# Patient Record
Sex: FEMALE
[De-identification: names, ages, dates, MRNs, and addresses within clinical notes are randomized per-mention and may not be internally consistent; named-entity substitution may affect disease eponyms.]

---

## 2021-04-22 ENCOUNTER — HOSPITAL ENCOUNTER (EMERGENCY)
Age: 51
Discharge: HOME | DRG: 153 | End: 2021-04-22
Payer: SELF-PAY

## 2021-04-22 DIAGNOSIS — J02.9: Primary | ICD-10-CM

## 2021-04-22 DIAGNOSIS — M79.671: ICD-10-CM

## 2021-04-22 DIAGNOSIS — E11.65: ICD-10-CM

## 2021-04-22 DIAGNOSIS — M79.602: ICD-10-CM

## 2021-04-22 DIAGNOSIS — M79.601: ICD-10-CM

## 2021-04-22 DIAGNOSIS — M79.672: ICD-10-CM

## 2021-04-22 DIAGNOSIS — M79.605: ICD-10-CM

## 2021-04-22 DIAGNOSIS — Z20.822: ICD-10-CM

## 2021-04-22 DIAGNOSIS — Z79.84: ICD-10-CM

## 2021-04-22 DIAGNOSIS — M79.604: ICD-10-CM

## 2021-04-22 DIAGNOSIS — M25.561: ICD-10-CM

## 2021-04-22 DIAGNOSIS — M25.562: ICD-10-CM

## 2021-04-22 LAB
ALBUMIN SERPL-MCNC: 4.6 G/DL (ref 3.2–5)
ALP SERPL-CCNC: 124 U/L (ref 38–126)
ANION GAP SERPL CALCULATED.3IONS-SCNC: 12 MMOL/L
AST SERPL-CCNC: 22 U/L (ref 14–36)
BASOPHILS NFR BLD AUTO: 0 % (ref 0–3)
BILIRUB UR QL STRIP.AUTO: NEGATIVE
BUN SERPL-MCNC: 9 MG/DL (ref 7–17)
BUN/CREAT SERPL: 27
CHLORIDE SERPL-SCNC: 96 MMOL/L (ref 95–108)
CO2 SERPL-SCNC: 28 MMOL/L (ref 22–30)
COLOR UR AUTO: YELLOW
CREAT SERPL-MCNC: 0.3 MG/DL (ref 0.5–1)
EOSINOPHIL NFR BLD AUTO: 1 % (ref 0–8)
ERYTHROCYTE [DISTWIDTH] IN BLOOD BY AUTOMATED COUNT: 18.9 % (ref 11.5–15.5)
GLUCOSE UR STRIP.AUTO-MCNC: >=1000 MG/DL
HCT VFR BLD AUTO: 32.5 % (ref 37–47)
HGB BLD-MCNC: 9.8 G/DL (ref 12–16)
HGB UR QL STRIP.AUTO: NEGATIVE
IMM GRANULOCYTES NFR BLD: 0.3 % (ref 0–5)
KETONES UR STRIP.AUTO-MCNC: NEGATIVE MG/DL
LEUKOCYTE ESTERASE UR QL STRIP.AUTO: NEGATIVE
LYMPHOCYTES NFR BLD: 23 % (ref 15–41)
MCH RBC QN AUTO: 22.5 PG  CALC (ref 26–32)
MCHC RBC AUTO-ENTMCNC: 30.2 G/DL CAL (ref 32–36)
MCV RBC AUTO: 74.5 FL  CALC (ref 80–100)
MONOCYTES NFR BLD AUTO: 9 % (ref 2–13)
NEUTROPHILS # BLD AUTO: 4.53 THOU/UL (ref 2–7.15)
NEUTROPHILS NFR BLD AUTO: 68 % (ref 42–76)
NITRITE UR QL STRIP.AUTO: NEGATIVE
PH UR STRIP.AUTO: 6.5 [PH] (ref 4.5–8)
PLATELET # BLD AUTO: 274 THOU/UL (ref 130–400)
POTASSIUM SERPL-SCNC: 4.2 MMOL/L (ref 3.5–5.1)
PROT SERPL-MCNC: 9 G/DL (ref 6.3–8.2)
PROT UR QL STRIP.AUTO: NEGATIVE MG/DL
RBC # BLD AUTO: 4.36 MILL/UL (ref 4.2–5.6)
SODIUM SERPL-SCNC: 132 MMOL/L (ref 137–146)
SP GR UR STRIP.AUTO: <=1.005
UROBILINOGEN UR QL STRIP.AUTO: 0.2 E.U./DL

## 2023-04-25 ENCOUNTER — HOSPITAL ENCOUNTER (EMERGENCY)
Dept: HOSPITAL 82 - ED | Age: 53
Discharge: HOME | DRG: 392 | End: 2023-04-25
Payer: SELF-PAY

## 2023-04-25 VITALS — DIASTOLIC BLOOD PRESSURE: 51 MMHG | SYSTOLIC BLOOD PRESSURE: 97 MMHG

## 2023-04-25 VITALS — SYSTOLIC BLOOD PRESSURE: 88 MMHG | DIASTOLIC BLOOD PRESSURE: 56 MMHG

## 2023-04-25 VITALS — HEIGHT: 62 IN | BODY MASS INDEX: 25.8 KG/M2 | WEIGHT: 140.21 LBS

## 2023-04-25 VITALS — SYSTOLIC BLOOD PRESSURE: 97 MMHG | DIASTOLIC BLOOD PRESSURE: 57 MMHG

## 2023-04-25 VITALS — DIASTOLIC BLOOD PRESSURE: 60 MMHG | SYSTOLIC BLOOD PRESSURE: 99 MMHG

## 2023-04-25 VITALS — DIASTOLIC BLOOD PRESSURE: 59 MMHG | SYSTOLIC BLOOD PRESSURE: 94 MMHG

## 2023-04-25 VITALS — SYSTOLIC BLOOD PRESSURE: 112 MMHG | DIASTOLIC BLOOD PRESSURE: 66 MMHG

## 2023-04-25 VITALS — DIASTOLIC BLOOD PRESSURE: 63 MMHG | SYSTOLIC BLOOD PRESSURE: 101 MMHG

## 2023-04-25 VITALS — SYSTOLIC BLOOD PRESSURE: 120 MMHG | DIASTOLIC BLOOD PRESSURE: 104 MMHG

## 2023-04-25 VITALS — DIASTOLIC BLOOD PRESSURE: 69 MMHG | SYSTOLIC BLOOD PRESSURE: 115 MMHG

## 2023-04-25 VITALS — DIASTOLIC BLOOD PRESSURE: 58 MMHG | SYSTOLIC BLOOD PRESSURE: 88 MMHG

## 2023-04-25 VITALS — DIASTOLIC BLOOD PRESSURE: 58 MMHG | SYSTOLIC BLOOD PRESSURE: 96 MMHG

## 2023-04-25 VITALS — SYSTOLIC BLOOD PRESSURE: 94 MMHG | DIASTOLIC BLOOD PRESSURE: 52 MMHG

## 2023-04-25 VITALS — SYSTOLIC BLOOD PRESSURE: 101 MMHG | DIASTOLIC BLOOD PRESSURE: 60 MMHG

## 2023-04-25 VITALS — SYSTOLIC BLOOD PRESSURE: 103 MMHG | DIASTOLIC BLOOD PRESSURE: 55 MMHG

## 2023-04-25 DIAGNOSIS — R19.7: ICD-10-CM

## 2023-04-25 DIAGNOSIS — Z79.84: ICD-10-CM

## 2023-04-25 DIAGNOSIS — R11.2: Primary | ICD-10-CM

## 2023-04-25 DIAGNOSIS — E11.9: ICD-10-CM

## 2023-04-25 LAB
ALBUMIN SERPL-MCNC: 4.9 G/DL (ref 3.2–5)
ALP SERPL-CCNC: 112 U/L (ref 38–126)
ANION GAP SERPL CALCULATED.3IONS-SCNC: 16 MMOL/L
AST SERPL-CCNC: 44 U/L (ref 14–36)
BACTERIA #/AREA URNS HPF: (no result) HPF
BASOPHILS NFR BLD AUTO: 0.2 % (ref 0–3)
BILIRUB UR QL STRIP.AUTO: NEGATIVE
BUN SERPL-MCNC: 13 MG/DL (ref 7–17)
BUN/CREAT SERPL: 22
CHLORIDE SERPL-SCNC: 102 MMOL/L (ref 95–108)
CO2 SERPL-SCNC: 21 MMOL/L (ref 22–30)
COLOR UR AUTO: YELLOW
CREAT SERPL-MCNC: 0.6 MG/DL (ref 0.5–1)
EOSINOPHIL NFR BLD AUTO: 2.7 % (ref 0–8)
ERYTHROCYTE [DISTWIDTH] IN BLOOD BY AUTOMATED COUNT: 16.7 % (ref 11.5–15.5)
GLUCOSE UR STRIP.AUTO-MCNC: >=1000 MG/DL
HCT VFR BLD AUTO: 41 % (ref 37–47)
HGB BLD-MCNC: 13 G/DL (ref 12–16)
HGB UR QL STRIP.AUTO: NEGATIVE
IMM GRANULOCYTES NFR BLD: 0.2 % (ref 0–5)
KETONES UR STRIP.AUTO-MCNC: NEGATIVE MG/DL
LEUKOCYTE ESTERASE UR QL STRIP.AUTO: NEGATIVE
LIPASE SERPL-CCNC: 84 U/L (ref 23–300)
LYMPHOCYTES NFR BLD: 33.7 % (ref 15–41)
MCH RBC QN AUTO: 26.2 PG  CALC (ref 26–32)
MCHC RBC AUTO-ENTMCNC: 31.7 G/DL CAL (ref 32–36)
MCV RBC AUTO: 82.7 FL  CALC (ref 80–100)
MONOCYTES NFR BLD AUTO: 8.1 % (ref 2–13)
NEUTROPHILS # BLD AUTO: 2.85 THOU/UL (ref 2–7.15)
NEUTROPHILS NFR BLD AUTO: 55.1 % (ref 42–76)
NITRITE UR QL STRIP.AUTO: POSITIVE
PH UR STRIP.AUTO: 6 [PH] (ref 4.5–8)
PLATELET # BLD AUTO: 251 THOU/UL (ref 130–400)
POTASSIUM SERPL-SCNC: 4.3 MMOL/L (ref 3.5–5.1)
PROT SERPL-MCNC: 8.9 G/DL (ref 6.3–8.2)
PROT UR QL STRIP.AUTO: NEGATIVE MG/DL
RBC # BLD AUTO: 4.96 MILL/UL (ref 4.2–5.6)
RBC #/AREA URNS HPF: (no result) RBC/HPF (ref 0–5)
SODIUM SERPL-SCNC: 135 MMOL/L (ref 137–146)
SP GR UR STRIP.AUTO: <=1.005
SQUAMOUS URNS QL MICRO: (no result) EPI/HPF
UROBILINOGEN UR QL STRIP.AUTO: 0.2 E.U./DL
WBC #/AREA URNS HPF: (no result) WBC/HPF (ref 0–5)

## 2023-04-27 NOTE — NUR
Contacted patient with  for follow up d/t critical result of 1/4
blood cultures from 4/25 visit positive for gram positive cocci, probable
contaminant. Pt states no new symptoms and she already as follow up
appointment schedule with per primary doctor.

## 2023-04-28 NOTE — NUR
FINAL BLOOD CULTURE SHOWS STAPH CAPITIS IN 1/4 VIALS. NO CHANGES NEEDED.
Patient reports feeling better.

## 2024-08-16 ENCOUNTER — HOSPITAL ENCOUNTER (INPATIENT)
Dept: HOSPITAL 82 - ED | Age: 54
LOS: 4 days | Discharge: HOME | DRG: 872 | End: 2024-08-20
Attending: INTERNAL MEDICINE | Admitting: INTERNAL MEDICINE
Payer: SELF-PAY

## 2024-08-16 VITALS — BODY MASS INDEX: 33.49 KG/M2 | WEIGHT: 181.99 LBS | HEIGHT: 62 IN

## 2024-08-16 VITALS — SYSTOLIC BLOOD PRESSURE: 116 MMHG | DIASTOLIC BLOOD PRESSURE: 60 MMHG

## 2024-08-16 VITALS — DIASTOLIC BLOOD PRESSURE: 55 MMHG | SYSTOLIC BLOOD PRESSURE: 125 MMHG

## 2024-08-16 DIAGNOSIS — R65.20: ICD-10-CM

## 2024-08-16 DIAGNOSIS — E87.20: ICD-10-CM

## 2024-08-16 DIAGNOSIS — M19.90: ICD-10-CM

## 2024-08-16 DIAGNOSIS — Z20.822: ICD-10-CM

## 2024-08-16 DIAGNOSIS — A41.51: Primary | ICD-10-CM

## 2024-08-16 DIAGNOSIS — M25.59: ICD-10-CM

## 2024-08-16 DIAGNOSIS — E11.9: ICD-10-CM

## 2024-08-16 DIAGNOSIS — Z79.84: ICD-10-CM

## 2024-08-16 DIAGNOSIS — I95.9: ICD-10-CM

## 2024-08-16 LAB
ALBUMIN SERPL-MCNC: 4.6 G/DL (ref 3.2–5)
ALP SERPL-CCNC: 114 U/L (ref 38–126)
ANION GAP SERPL CALCULATED.3IONS-SCNC: 14 MMOL/L
AST SERPL-CCNC: 51 U/L (ref 14–36)
BASOPHILS NFR BLD AUTO: 0.1 % (ref 0–3)
BUN SERPL-MCNC: 13 MG/DL (ref 7–17)
BUN/CREAT SERPL: 27
CHLORIDE SERPL-SCNC: 104 MMOL/L (ref 95–108)
CO2 SERPL-SCNC: 23 MMOL/L (ref 22–30)
CREAT SERPL-MCNC: 0.5 MG/DL (ref 0.5–1)
EOSINOPHIL NFR BLD AUTO: 0.4 % (ref 0–8)
ERYTHROCYTE [DISTWIDTH] IN BLOOD BY AUTOMATED COUNT: 15.7 % (ref 11.5–15.5)
GLUCOSE UR STRIP.AUTO-MCNC: 500 MG/DL
HCT VFR BLD AUTO: 36.5 % (ref 37–47)
HGB BLD-MCNC: 11.8 G/DL (ref 12–16)
IMM GRANULOCYTES NFR BLD: 0.3 % (ref 0–5)
KETONES UR STRIP.AUTO-MCNC: 40 MG/DL
LYMPHOCYTES NFR BLD: 14.2 % (ref 15–41)
MCH RBC QN AUTO: 26.3 PG  CALC (ref 26–32)
MCHC RBC AUTO-ENTMCNC: 32.3 G/DL CAL (ref 32–36)
MCV RBC AUTO: 81.5 FL  CALC (ref 80–100)
MONOCYTES NFR BLD AUTO: 5 % (ref 2–13)
NEUTROPHILS # BLD AUTO: 5.79 THOU/UL (ref 2–7.15)
NEUTROPHILS NFR BLD AUTO: 80 % (ref 42–76)
PH UR STRIP.AUTO: 6 [PH] (ref 4.5–8)
PLATELET # BLD AUTO: 246 THOU/UL (ref 130–400)
POTASSIUM SERPL-SCNC: 3.4 MMOL/L (ref 3.5–5.1)
PROT SERPL-MCNC: 8.7 G/DL (ref 6.3–8.2)
RBC # BLD AUTO: 4.48 MILL/UL (ref 4.2–5.6)
SODIUM SERPL-SCNC: 138 MMOL/L (ref 137–146)
SP GR UR STRIP.AUTO: 1.01
UROBILINOGEN UR QL STRIP.AUTO: 0.2 E.U./DL

## 2024-08-17 VITALS — DIASTOLIC BLOOD PRESSURE: 53 MMHG | SYSTOLIC BLOOD PRESSURE: 100 MMHG

## 2024-08-17 VITALS — SYSTOLIC BLOOD PRESSURE: 91 MMHG | DIASTOLIC BLOOD PRESSURE: 39 MMHG

## 2024-08-17 VITALS — DIASTOLIC BLOOD PRESSURE: 70 MMHG | SYSTOLIC BLOOD PRESSURE: 139 MMHG

## 2024-08-17 VITALS — SYSTOLIC BLOOD PRESSURE: 83 MMHG | DIASTOLIC BLOOD PRESSURE: 36 MMHG

## 2024-08-17 VITALS — DIASTOLIC BLOOD PRESSURE: 62 MMHG | SYSTOLIC BLOOD PRESSURE: 116 MMHG

## 2024-08-17 VITALS — DIASTOLIC BLOOD PRESSURE: 59 MMHG | SYSTOLIC BLOOD PRESSURE: 102 MMHG

## 2024-08-17 LAB
ALBUMIN SERPL-MCNC: 3.8 G/DL (ref 3.2–5)
ALP SERPL-CCNC: 110 U/L (ref 38–126)
ANION GAP SERPL CALCULATED.3IONS-SCNC: 13 MMOL/L
AST SERPL-CCNC: 59 U/L (ref 14–36)
BUN SERPL-MCNC: 7 MG/DL (ref 7–17)
BUN/CREAT SERPL: 19
CHLORIDE SERPL-SCNC: 111 MMOL/L (ref 95–108)
CO2 SERPL-SCNC: 17 MMOL/L (ref 22–30)
CREAT SERPL-MCNC: 0.4 MG/DL (ref 0.5–1)
CRP SERPL-MCNC: 19.6 MG/DL (ref 0–0.9)
ERYTHROCYTE [DISTWIDTH] IN BLOOD BY AUTOMATED COUNT: 16.1 % (ref 11.5–15.5)
HCT VFR BLD AUTO: 34.8 % (ref 37–47)
HGB BLD-MCNC: 10.9 G/DL (ref 12–16)
MCH RBC QN AUTO: 26.3 PG  CALC (ref 26–32)
MCHC RBC AUTO-ENTMCNC: 31.3 G/DL CAL (ref 32–36)
MCV RBC AUTO: 83.9 FL  CALC (ref 80–100)
PLATELET # BLD AUTO: 218 THOU/UL (ref 130–400)
POTASSIUM SERPL-SCNC: 3.3 MMOL/L (ref 3.5–5.1)
PROT SERPL-MCNC: 7.4 G/DL (ref 6.3–8.2)
RBC # BLD AUTO: 4.15 MILL/UL (ref 4.2–5.6)
SODIUM SERPL-SCNC: 138 MMOL/L (ref 137–146)

## 2024-08-18 VITALS — DIASTOLIC BLOOD PRESSURE: 63 MMHG | SYSTOLIC BLOOD PRESSURE: 133 MMHG

## 2024-08-18 VITALS — SYSTOLIC BLOOD PRESSURE: 120 MMHG | DIASTOLIC BLOOD PRESSURE: 65 MMHG

## 2024-08-18 VITALS — SYSTOLIC BLOOD PRESSURE: 126 MMHG | DIASTOLIC BLOOD PRESSURE: 62 MMHG

## 2024-08-18 VITALS — DIASTOLIC BLOOD PRESSURE: 62 MMHG | SYSTOLIC BLOOD PRESSURE: 129 MMHG

## 2024-08-18 VITALS — DIASTOLIC BLOOD PRESSURE: 53 MMHG | SYSTOLIC BLOOD PRESSURE: 108 MMHG

## 2024-08-18 VITALS — DIASTOLIC BLOOD PRESSURE: 60 MMHG | SYSTOLIC BLOOD PRESSURE: 128 MMHG

## 2024-08-18 VITALS — DIASTOLIC BLOOD PRESSURE: 63 MMHG | SYSTOLIC BLOOD PRESSURE: 132 MMHG

## 2024-08-18 LAB
ALBUMIN SERPL-MCNC: 3.7 G/DL (ref 3.2–5)
ALBUMIN SERPL-MCNC: 4 G/DL (ref 3.2–5)
ALP SERPL-CCNC: 121 U/L (ref 38–126)
ALP SERPL-CCNC: 145 U/L (ref 38–126)
ANION GAP SERPL CALCULATED.3IONS-SCNC: 13 MMOL/L
ANION GAP SERPL CALCULATED.3IONS-SCNC: 22 MMOL/L
AST SERPL-CCNC: 30 U/L (ref 14–36)
AST SERPL-CCNC: 33 U/L (ref 14–36)
BUN SERPL-MCNC: 12 MG/DL (ref 7–17)
BUN SERPL-MCNC: 6 MG/DL (ref 7–17)
BUN/CREAT SERPL: 14
BUN/CREAT SERPL: 19
CHLORIDE SERPL-SCNC: 113 MMOL/L (ref 95–108)
CHLORIDE SERPL-SCNC: 116 MMOL/L (ref 95–108)
CO2 SERPL-SCNC: 14 MMOL/L (ref 22–30)
CO2 SERPL-SCNC: 5 MMOL/L (ref 22–30)
CREAT SERPL-MCNC: 0.4 MG/DL (ref 0.5–1)
CREAT SERPL-MCNC: 0.6 MG/DL (ref 0.5–1)
ERYTHROCYTE [DISTWIDTH] IN BLOOD BY AUTOMATED COUNT: 16.2 % (ref 11.5–15.5)
HCT VFR BLD AUTO: 36.7 % (ref 37–47)
HGB BLD-MCNC: 11.3 G/DL (ref 12–16)
MAGNESIUM SERPL-MCNC: 1.7 MG/DL (ref 1.6–2.3)
MCH RBC QN AUTO: 26.7 PG  CALC (ref 26–32)
MCHC RBC AUTO-ENTMCNC: 30.8 G/DL CAL (ref 32–36)
MCV RBC AUTO: 86.8 FL  CALC (ref 80–100)
PLATELET # BLD AUTO: 216 THOU/UL (ref 130–400)
POTASSIUM SERPL-SCNC: 4 MMOL/L (ref 3.5–5.1)
POTASSIUM SERPL-SCNC: 4.2 MMOL/L (ref 3.5–5.1)
PROT SERPL-MCNC: 7.2 G/DL (ref 6.3–8.2)
PROT SERPL-MCNC: 7.5 G/DL (ref 6.3–8.2)
RBC # BLD AUTO: 4.23 MILL/UL (ref 4.2–5.6)
SODIUM SERPL-SCNC: 136 MMOL/L (ref 137–146)
SODIUM SERPL-SCNC: 139 MMOL/L (ref 137–146)

## 2024-08-19 VITALS — SYSTOLIC BLOOD PRESSURE: 118 MMHG | DIASTOLIC BLOOD PRESSURE: 58 MMHG

## 2024-08-19 VITALS — SYSTOLIC BLOOD PRESSURE: 129 MMHG | DIASTOLIC BLOOD PRESSURE: 64 MMHG

## 2024-08-19 VITALS — SYSTOLIC BLOOD PRESSURE: 140 MMHG | DIASTOLIC BLOOD PRESSURE: 70 MMHG

## 2024-08-19 VITALS — DIASTOLIC BLOOD PRESSURE: 74 MMHG | SYSTOLIC BLOOD PRESSURE: 153 MMHG

## 2024-08-19 VITALS — DIASTOLIC BLOOD PRESSURE: 58 MMHG | SYSTOLIC BLOOD PRESSURE: 118 MMHG

## 2024-08-19 VITALS — SYSTOLIC BLOOD PRESSURE: 131 MMHG | DIASTOLIC BLOOD PRESSURE: 69 MMHG

## 2024-08-19 VITALS — SYSTOLIC BLOOD PRESSURE: 117 MMHG | DIASTOLIC BLOOD PRESSURE: 66 MMHG

## 2024-08-19 LAB
ALBUMIN SERPL-MCNC: 3.6 G/DL (ref 3.2–5)
ALP SERPL-CCNC: 123 U/L (ref 38–126)
ANION GAP SERPL CALCULATED.3IONS-SCNC: 14 MMOL/L
AST SERPL-CCNC: 25 U/L (ref 14–36)
BUN SERPL-MCNC: 8 MG/DL (ref 7–17)
BUN/CREAT SERPL: 20
CHLORIDE SERPL-SCNC: 114 MMOL/L (ref 95–108)
CO2 SERPL-SCNC: 12 MMOL/L (ref 22–30)
CREAT SERPL-MCNC: 0.4 MG/DL (ref 0.5–1)
ERYTHROCYTE [DISTWIDTH] IN BLOOD BY AUTOMATED COUNT: 15.7 % (ref 11.5–15.5)
HCT VFR BLD AUTO: 33.7 % (ref 37–47)
HGB BLD-MCNC: 10.6 G/DL (ref 12–16)
MAGNESIUM SERPL-MCNC: 1.7 MG/DL (ref 1.6–2.3)
MCH RBC QN AUTO: 25.9 PG  CALC (ref 26–32)
MCHC RBC AUTO-ENTMCNC: 31.5 G/DL CAL (ref 32–36)
MCV RBC AUTO: 82.4 FL  CALC (ref 80–100)
PLATELET # BLD AUTO: 223 THOU/UL (ref 130–400)
POTASSIUM SERPL-SCNC: 3.8 MMOL/L (ref 3.5–5.1)
PROT SERPL-MCNC: 7 G/DL (ref 6.3–8.2)
RBC # BLD AUTO: 4.09 MILL/UL (ref 4.2–5.6)
SODIUM SERPL-SCNC: 137 MMOL/L (ref 137–146)

## 2024-08-20 VITALS — DIASTOLIC BLOOD PRESSURE: 71 MMHG | SYSTOLIC BLOOD PRESSURE: 136 MMHG

## 2024-08-20 VITALS — SYSTOLIC BLOOD PRESSURE: 141 MMHG | DIASTOLIC BLOOD PRESSURE: 74 MMHG

## 2024-08-20 VITALS — DIASTOLIC BLOOD PRESSURE: 69 MMHG | SYSTOLIC BLOOD PRESSURE: 131 MMHG

## 2024-08-20 VITALS — DIASTOLIC BLOOD PRESSURE: 59 MMHG | SYSTOLIC BLOOD PRESSURE: 126 MMHG

## 2024-08-20 LAB
ALBUMIN SERPL-MCNC: 3.6 G/DL (ref 3.2–5)
ALP SERPL-CCNC: 116 U/L (ref 38–126)
ANION GAP SERPL CALCULATED.3IONS-SCNC: 13 MMOL/L
AST SERPL-CCNC: 31 U/L (ref 14–36)
BASOPHILS NFR BLD AUTO: 0.5 % (ref 0–3)
BUN SERPL-MCNC: 7 MG/DL (ref 7–17)
BUN/CREAT SERPL: 21
CHLORIDE SERPL-SCNC: 113 MMOL/L (ref 95–108)
CO2 SERPL-SCNC: 16 MMOL/L (ref 22–30)
CREAT SERPL-MCNC: 0.3 MG/DL (ref 0.5–1)
EOSINOPHIL NFR BLD AUTO: 1.3 % (ref 0–8)
ERYTHROCYTE [DISTWIDTH] IN BLOOD BY AUTOMATED COUNT: 15.8 % (ref 11.5–15.5)
HCT VFR BLD AUTO: 32.5 % (ref 37–47)
HGB BLD-MCNC: 10.5 G/DL (ref 12–16)
IMM GRANULOCYTES NFR BLD: 0.3 % (ref 0–5)
LYMPHOCYTES NFR BLD: 31.7 % (ref 15–41)
MAGNESIUM SERPL-MCNC: 1.7 MG/DL (ref 1.6–2.3)
MCH RBC QN AUTO: 26.4 PG  CALC (ref 26–32)
MCHC RBC AUTO-ENTMCNC: 32.3 G/DL CAL (ref 32–36)
MCV RBC AUTO: 81.9 FL  CALC (ref 80–100)
MONOCYTES NFR BLD AUTO: 12.9 % (ref 2–13)
NEUTROPHILS # BLD AUTO: 2.1 THOU/UL (ref 2–7.15)
NEUTROPHILS NFR BLD AUTO: 53.3 % (ref 42–76)
PLATELET # BLD AUTO: 246 THOU/UL (ref 130–400)
POTASSIUM SERPL-SCNC: 3.8 MMOL/L (ref 3.5–5.1)
PROT SERPL-MCNC: 6.9 G/DL (ref 6.3–8.2)
RBC # BLD AUTO: 3.97 MILL/UL (ref 4.2–5.6)
SODIUM SERPL-SCNC: 138 MMOL/L (ref 137–146)